# Patient Record
Sex: FEMALE | Race: WHITE | NOT HISPANIC OR LATINO | Employment: FULL TIME | URBAN - METROPOLITAN AREA
[De-identification: names, ages, dates, MRNs, and addresses within clinical notes are randomized per-mention and may not be internally consistent; named-entity substitution may affect disease eponyms.]

---

## 2017-06-07 ENCOUNTER — APPOINTMENT (OUTPATIENT)
Dept: WOMENS IMAGING | Facility: HOSPITAL | Age: 52
End: 2017-06-07

## 2017-06-07 PROCEDURE — 77067 SCR MAMMO BI INCL CAD: CPT | Performed by: RADIOLOGY

## 2017-11-01 ENCOUNTER — PREP FOR SURGERY (OUTPATIENT)
Dept: OTHER | Facility: HOSPITAL | Age: 52
End: 2017-11-01

## 2017-11-01 DIAGNOSIS — Z12.11 SCREENING FOR COLON CANCER: Primary | ICD-10-CM

## 2017-12-12 PROBLEM — Z12.11 SCREENING FOR COLON CANCER: Status: ACTIVE | Noted: 2017-12-12

## 2018-05-04 RX ORDER — DIPHENHYDRAMINE HCL 25 MG
25 CAPSULE ORAL NIGHTLY PRN
COMMUNITY

## 2018-05-04 RX ORDER — ERGOCALCIFEROL 1.25 MG/1
50000 CAPSULE ORAL WEEKLY
COMMUNITY

## 2018-05-04 RX ORDER — LOVASTATIN 20 MG/1
20 TABLET ORAL NIGHTLY
COMMUNITY

## 2018-05-04 RX ORDER — LANOLIN ALCOHOL/MO/W.PET/CERES
1000 CREAM (GRAM) TOPICAL DAILY
COMMUNITY

## 2018-05-04 RX ORDER — VENLAFAXINE HYDROCHLORIDE 37.5 MG/1
37.5 CAPSULE, EXTENDED RELEASE ORAL DAILY
COMMUNITY

## 2018-05-04 RX ORDER — CHLORAL HYDRATE 500 MG
1000 CAPSULE ORAL
COMMUNITY

## 2018-05-04 RX ORDER — BENAZEPRIL HYDROCHLORIDE AND HYDROCHLOROTHIAZIDE 20; 12.5 MG/1; MG/1
2 TABLET ORAL DAILY
COMMUNITY
Start: 2015-01-30

## 2018-05-07 ENCOUNTER — ANESTHESIA EVENT (OUTPATIENT)
Dept: GASTROENTEROLOGY | Facility: HOSPITAL | Age: 53
End: 2018-05-07

## 2018-05-07 ENCOUNTER — ANESTHESIA (OUTPATIENT)
Dept: GASTROENTEROLOGY | Facility: HOSPITAL | Age: 53
End: 2018-05-07

## 2018-05-07 ENCOUNTER — HOSPITAL ENCOUNTER (OUTPATIENT)
Facility: HOSPITAL | Age: 53
Setting detail: HOSPITAL OUTPATIENT SURGERY
Discharge: HOME | End: 2018-05-07
Attending: INTERNAL MEDICINE | Admitting: INTERNAL MEDICINE

## 2018-05-07 VITALS
OXYGEN SATURATION: 98 % | WEIGHT: 254.6 LBS | HEART RATE: 64 BPM | DIASTOLIC BLOOD PRESSURE: 77 MMHG | SYSTOLIC BLOOD PRESSURE: 111 MMHG | BODY MASS INDEX: 45.11 KG/M2 | HEIGHT: 63 IN | RESPIRATION RATE: 16 BRPM | TEMPERATURE: 97.9 F

## 2018-05-07 DIAGNOSIS — Z12.11 SCREENING FOR COLON CANCER: ICD-10-CM

## 2018-05-07 PROCEDURE — 45380 COLONOSCOPY AND BIOPSY: CPT | Performed by: INTERNAL MEDICINE

## 2018-05-07 PROCEDURE — 25010000002 PROPOFOL 10 MG/ML EMULSION: Performed by: ANESTHESIOLOGY

## 2018-05-07 PROCEDURE — 45385 COLONOSCOPY W/LESION REMOVAL: CPT | Performed by: INTERNAL MEDICINE

## 2018-05-07 PROCEDURE — S0260 H&P FOR SURGERY: HCPCS | Performed by: INTERNAL MEDICINE

## 2018-05-07 PROCEDURE — 88305 TISSUE EXAM BY PATHOLOGIST: CPT | Performed by: INTERNAL MEDICINE

## 2018-05-07 RX ORDER — SODIUM CHLORIDE, SODIUM LACTATE, POTASSIUM CHLORIDE, CALCIUM CHLORIDE 600; 310; 30; 20 MG/100ML; MG/100ML; MG/100ML; MG/100ML
1000 INJECTION, SOLUTION INTRAVENOUS CONTINUOUS
Status: DISCONTINUED | OUTPATIENT
Start: 2018-05-07 | End: 2018-05-07 | Stop reason: HOSPADM

## 2018-05-07 RX ORDER — PROPOFOL 10 MG/ML
VIAL (ML) INTRAVENOUS AS NEEDED
Status: DISCONTINUED | OUTPATIENT
Start: 2018-05-07 | End: 2018-05-07 | Stop reason: SURG

## 2018-05-07 RX ORDER — SODIUM CHLORIDE 0.9 % (FLUSH) 0.9 %
3 SYRINGE (ML) INJECTION AS NEEDED
Status: DISCONTINUED | OUTPATIENT
Start: 2018-05-07 | End: 2018-05-07 | Stop reason: HOSPADM

## 2018-05-07 RX ORDER — PROPOFOL 10 MG/ML
VIAL (ML) INTRAVENOUS CONTINUOUS PRN
Status: DISCONTINUED | OUTPATIENT
Start: 2018-05-07 | End: 2018-05-07 | Stop reason: SURG

## 2018-05-07 RX ORDER — LIDOCAINE HYDROCHLORIDE 20 MG/ML
INJECTION, SOLUTION INFILTRATION; PERINEURAL AS NEEDED
Status: DISCONTINUED | OUTPATIENT
Start: 2018-05-07 | End: 2018-05-07 | Stop reason: SURG

## 2018-05-07 RX ORDER — LIDOCAINE HYDROCHLORIDE 10 MG/ML
0.5 INJECTION, SOLUTION INFILTRATION; PERINEURAL ONCE AS NEEDED
Status: DISCONTINUED | OUTPATIENT
Start: 2018-05-07 | End: 2018-05-07 | Stop reason: HOSPADM

## 2018-05-07 RX ADMIN — EPHEDRINE SULFATE 10 MG: 50 INJECTION INTRAMUSCULAR; INTRAVENOUS; SUBCUTANEOUS at 13:28

## 2018-05-07 RX ADMIN — LIDOCAINE HYDROCHLORIDE 30 MG: 20 INJECTION, SOLUTION INFILTRATION; PERINEURAL at 13:06

## 2018-05-07 RX ADMIN — SODIUM CHLORIDE, POTASSIUM CHLORIDE, SODIUM LACTATE AND CALCIUM CHLORIDE 1000 ML: 600; 310; 30; 20 INJECTION, SOLUTION INTRAVENOUS at 12:27

## 2018-05-07 RX ADMIN — SODIUM CHLORIDE, POTASSIUM CHLORIDE, SODIUM LACTATE AND CALCIUM CHLORIDE: 600; 310; 30; 20 INJECTION, SOLUTION INTRAVENOUS at 12:59

## 2018-05-07 RX ADMIN — PROPOFOL 300 MCG/KG/MIN: 10 INJECTION, EMULSION INTRAVENOUS at 13:06

## 2018-05-07 RX ADMIN — PROPOFOL 150 MG: 10 INJECTION, EMULSION INTRAVENOUS at 13:06

## 2018-05-07 NOTE — ANESTHESIA POSTPROCEDURE EVALUATION
Patient: Darby Ramesh    Procedure Summary     Date:  05/07/18 Room / Location:  [facility] ENDOSCOPY 6 /  [facility]    Anesthesia Start:  1259 Anesthesia Stop:  1333    Procedure:  COLONOSCOPY WITH POLYPECTOMY (COLD BX AND HOT SNARE) (N/A ) Diagnosis:       Screening for colon cancer      (Screening for colon cancer [Z12.11])    Surgeon:  Tasia Maldonado MD Provider:  Lucho Eagle MD    Anesthesia Type:  MAC ASA Status:  3          Anesthesia Type: MAC  Last vitals  BP   131/73 (05/07/18 1217)   Temp   36.8 °C (98.2 °F) (05/07/18 1217)   Pulse   73 (05/07/18 1217)   Resp   18 (05/07/18 1217)     SpO2   99 % (05/07/18 1217)     Post Anesthesia Care and Evaluation    Patient location during evaluation: PHASE II  Patient participation: complete - patient participated  Level of consciousness: awake and alert  Pain management: adequate  Airway patency: patent  Anesthetic complications: No anesthetic complications  PONV Status: none  Cardiovascular status: acceptable  Respiratory status: acceptable  Hydration status: acceptable

## 2018-05-07 NOTE — ANESTHESIA PREPROCEDURE EVALUATION
Anesthesia Evaluation     Patient summary reviewed and Nursing notes reviewed                Airway   Mallampati: III  TM distance: <3 FB  Neck ROM: full  Possible difficult intubation  Dental - normal exam     Pulmonary - normal exam   Cardiovascular - normal exam    (+) hypertension, hyperlipidemia,       Neuro/Psych  (+) psychiatric history Anxiety,     GI/Hepatic/Renal/Endo    (+) obesity, morbid obesity, GERD,      Musculoskeletal     Abdominal    Substance History      OB/GYN          Other                      Anesthesia Plan    ASA 3     MAC     intravenous induction   Anesthetic plan and risks discussed with patient.

## 2018-05-07 NOTE — DISCHARGE INSTRUCTIONS
If you do not get your pathology results within 2 weeks, call Dr. Maldonado at [phone]  Your pathology results will determine the timing of your next colonoscopy

## 2018-05-07 NOTE — H&P
[facility]  Pre Procedure History & Physical    Chief Complaint:   screening    Subjective     HPI:   54 yo WF for initial screening colonoscopy.  She denies any GI issues.  No FH CRC/polyps.    Past Medical History:   Past Medical History:   Diagnosis Date   • Anxiety    • GERD (gastroesophageal reflux disease)    • Hyperlipidemia    • Hypertension        Past Surgical History:  Past Surgical History:   Procedure Laterality Date   •  SECTION      X2       Family History:  Family History   Problem Relation Age of Onset   • Malig Hyperthermia Neg Hx        Social History:   reports that she has never smoked. She has never used smokeless tobacco. She reports that she does not drink alcohol or use drugs.    Medications:   Prescriptions Prior to Admission   Medication Sig Dispense Refill Last Dose   • benazepril-hydrochlorthiazide (LOTENSIN HCT) 20-12.5 MG per tablet Take 2 tablets by mouth Daily.   2018 at Unknown time   • diphenhydrAMINE (BENADRYL) 25 mg capsule Take 25 mg by mouth At Night As Needed for Itching.   2018   • lovastatin (MEVACOR) 20 MG tablet Take 20 mg by mouth Every Night.   2018   • Omega-3 Fatty Acids (FISH OIL) 1000 MG capsule capsule Take 1,000 mg by mouth Daily With Breakfast.   2018   • Probiotic Product (PROBIOTIC ADVANCED PO) Take 1 melissa/oz by mouth Daily.   2018   • venlafaxine XR (EFFEXOR-XR) 37.5 MG 24 hr capsule Take 37.5 mg by mouth Daily.   2018 at Unknown time   • vitamin B-12 (CYANOCOBALAMIN) 1000 MCG tablet Take 1,000 mcg by mouth Daily.   2018   • vitamin D (ERGOCALCIFEROL) 42283 units capsule capsule Take 50,000 Units by mouth 1 (One) Time Per Week.   2018       Allergies:  Erythromycin; Penicillins; and Sulfa antibiotics    ROS:    Pertinent items are noted in HPI, all other systems reviewed and negative     Objective     Blood pressure 131/73, pulse 73, temperature 98.2 °F (36.8 °C), temperature source Oral, resp.  "rate 18, height 158.8 cm (62.5\"), weight 115 kg (254 lb 9.6 oz), last menstrual period 05/02/2018, SpO2 99 %.    Physical Exam   Constitutional: Pt is oriented to person, place, and time and well-developed, well-nourished, and in no distress.   Mouth/Throat: Oropharynx is clear and moist.   Neck: Normal range of motion.   Cardiovascular: Normal rate, regular rhythm and normal heart sounds.    Pulmonary/Chest: Effort normal and breath sounds normal.   Abdominal: Soft. Nontender  Skin: Skin is warm and dry.   Psychiatric: Mood, memory, affect and judgment normal.     Assessment/Plan     Diagnosis:  screening    Anticipated Surgical Procedure:  colonoscopy    The risks, benefits, and alternatives of this procedure have been discussed with the patient or the responsible party- the patient understands and agrees to proceed.                                                            "

## 2018-05-08 ENCOUNTER — TELEPHONE (OUTPATIENT)
Dept: GASTROENTEROLOGY | Facility: CLINIC | Age: 53
End: 2018-05-08

## 2018-05-08 LAB
CYTO UR: NORMAL
LAB AP CASE REPORT: NORMAL
Lab: NORMAL
PATH REPORT.FINAL DX SPEC: NORMAL
PATH REPORT.GROSS SPEC: NORMAL

## 2018-05-10 NOTE — TELEPHONE ENCOUNTER
Call to pt.  Advise per Dr Maldonado that the polyp(s) bx showed adenomatous change.  This is not cancerous, but is considered potentially precancerous.  F/u c/s in 3 yrs is advised.  Pt verb understanding.    C/s for 5/7/21 is in recall.

## 2018-07-30 ENCOUNTER — APPOINTMENT (OUTPATIENT)
Dept: WOMENS IMAGING | Facility: HOSPITAL | Age: 53
End: 2018-07-30

## 2018-07-30 PROCEDURE — 77063 BREAST TOMOSYNTHESIS BI: CPT | Performed by: RADIOLOGY

## 2018-07-30 PROCEDURE — 77067 SCR MAMMO BI INCL CAD: CPT | Performed by: RADIOLOGY

## 2018-11-12 ENCOUNTER — TELEPHONE (OUTPATIENT)
Dept: GASTROENTEROLOGY | Facility: CLINIC | Age: 53
End: 2018-11-12

## 2018-11-12 NOTE — TELEPHONE ENCOUNTER
Call to pt.  State was d/c from [facility] yesterday - advised to have EGD.    Advise will obtain records for review.  Verb understanding. -

## 2018-11-12 NOTE — TELEPHONE ENCOUNTER
----- Message from Andrés Díaz sent at 11/12/2018  3:09 PM EST -----  Regarding: EGD  Contact: [phone]  PT WAS DISCHARGE FROM THE HOSPITAL TODAY AND NEED TO BE SCHFOR AN EGD DUE TO HAVE GI PROBLEMS

## 2018-11-13 NOTE — TELEPHONE ENCOUNTER
Call to [facility] @ [phone] - QC left with request for recent hosp d/c summary, labs/imaging be faxed to [phone].

## 2018-11-14 ENCOUNTER — TELEPHONE (OUTPATIENT)
Dept: GASTROENTEROLOGY | Facility: CLINIC | Age: 53
End: 2018-11-14

## 2018-11-14 NOTE — TELEPHONE ENCOUNTER
Regarding: Non-Urgent Medical Question  Contact: [phone]  ----- Message from Nyla, Generic sent at 11/14/2018  3:17 PM EST -----    Just following up re my phone conversation with Reta Joseph on Monday, 11-12-18, re pain/burning in my chest and back. I was hospitalized on Sunday evening at [facility]. I was checked thoroughly for heart issues which doesn't appear to be the problem. I was prescribed the generic form of Protonix but am still having issues with the pain/burning. Of course, I've only been on the medication for 3 days. My primary care doctor information is below if that would be helpful. I have an appointment with her on Tuesday, November 20, to follow up regarding this same issue.  Thank you!    Terrie Solitario MD  [facility]  [address], [place] [address] [phone]

## 2018-11-14 NOTE — TELEPHONE ENCOUNTER
Extensive records received - under Media Tab as [place], [place] First Addendum, and [place] Second Addendum.    Message to Dr Maldonado.

## 2018-11-14 NOTE — TELEPHONE ENCOUNTER
Call to pt.  Advise that records from [facility] received today and to Dr Maldonado for review.  Will also forward this message to DR Maldonado.  Pt verb understanding.

## 2018-11-15 ENCOUNTER — PREP FOR SURGERY (OUTPATIENT)
Dept: OTHER | Facility: HOSPITAL | Age: 53
End: 2018-11-15

## 2018-11-15 DIAGNOSIS — R12 HEARTBURN: ICD-10-CM

## 2018-11-15 DIAGNOSIS — R07.89 OTHER CHEST PAIN: Primary | ICD-10-CM

## 2018-11-15 NOTE — TELEPHONE ENCOUNTER
Records reviewed Recommend Carafate 4 times a day as well-okay to schedule EGD at [facility] only-case request in chart

## 2018-11-16 RX ORDER — SUCRALFATE ORAL 1 G/10ML
1 SUSPENSION ORAL 4 TIMES DAILY
Qty: 1000 ML | Refills: 1 | Status: SHIPPED | OUTPATIENT
Start: 2018-11-16 | End: 2019-03-26

## 2018-11-16 NOTE — TELEPHONE ENCOUNTER
Called pt and advised per Dr Maldonado that she has reviewed her records and she recommens carafate 4 times a dya as well.  Ok to schedule egd at [facility] only.      Pt verb understanding and reports the carafate sups was very expensive.  Advised pt we will call her pharmacy and will change this to tablet form.  Also advised pt that she can dissolve the tablet in a sm bit of water and drink. Pt verb understanding.     Called pt's [facility] and spoke with pharmacist Osorio and had them switch to carafate tabs.  He repeated and verified.

## 2018-11-19 ENCOUNTER — TELEPHONE (OUTPATIENT)
Dept: GASTROENTEROLOGY | Facility: CLINIC | Age: 53
End: 2018-11-19

## 2018-11-19 NOTE — TELEPHONE ENCOUNTER
Call to pt.  Advise that DR Maldonado out of office this week.  Will send message to Jeane re: fmla, but this will be delayed in completion.  Pt verb understanding.    Pt fmla forms under Media Tab.

## 2018-11-19 NOTE — TELEPHONE ENCOUNTER
Regarding: Non-Urgent Medical Question  Contact: [phone]  ----- Message from [employer], Generic sent at 11/16/2018  3:58 PM EST -----    I received the prescribed medication and look forward to scheduling the endoscopy.  I believe the Protonix is causing me to be dizzy and lightheaded; however, I will try again tomorrow and begin the new medication as well.  I have a low intolerance for a lot of medications.    Also - unfortunately, I am out of PTO time for my hospital stay, doctors and procedures for this year and have been granted intermittent FMLA.  I attach a four page FMLA certification form which I will need completed by Dr. Maldonado as soon as it is convenient for her to do so.    Please let me know if you have any questions or need any further information from me.  I greatly appreciate your assistance.    Thank you,  Lanie Ramesh

## 2018-11-26 ENCOUNTER — TELEPHONE (OUTPATIENT)
Dept: GASTROENTEROLOGY | Facility: CLINIC | Age: 53
End: 2018-11-26

## 2018-11-26 NOTE — TELEPHONE ENCOUNTER
Regarding: Non-Urgent Medical Question  Contact: [phone]  ----- Message from [employer], Generic sent at 11/26/2018 12:25 PM EST -----    DIEGO - I was advised that I would be receiving a call from your scheduling department re an appointment for an endoscopy (EGD) with Dr. Maldonado.  Just wanted to let you know that I have not received a call yet.    Thank you,  Lanie

## 2018-12-26 ENCOUNTER — TELEPHONE (OUTPATIENT)
Dept: GASTROENTEROLOGY | Facility: CLINIC | Age: 53
End: 2018-12-26

## 2018-12-26 NOTE — TELEPHONE ENCOUNTER
Called pt and advised if running fever should reschedule egd.  Pt states she has not had a fever today and will see how she feels tomorrow.  Pt states if feeling bad , will call back and reschedule. Update sent to Dr Maldonado.

## 2018-12-26 NOTE — TELEPHONE ENCOUNTER
Regarding: Non-Urgent Medical Question  Contact: [phone]  ----- Message from [employer], Generic sent at 12/26/2018  9:24 AM EST -----    Good morning.    I am scheduled for an EGD this  Friday morning at [facility] with Dr. Maldonado.  I have been battling a cough and low grade fever for several days now.  Urgent care on Friday said that it was viral, but since I'm not better - I am checking with my primary care doctor.    Will I still be able to have the procedure done on Friday if I'm still coughing, etc.?    Please advise.  Thank you.    Lanie Ramesh

## 2019-01-18 ENCOUNTER — OUTSIDE FACILITY SERVICE (OUTPATIENT)
Dept: GASTROENTEROLOGY | Facility: CLINIC | Age: 54
End: 2019-01-18

## 2019-01-18 PROCEDURE — 43239 EGD BIOPSY SINGLE/MULTIPLE: CPT | Performed by: INTERNAL MEDICINE

## 2019-01-28 ENCOUNTER — TELEPHONE (OUTPATIENT)
Dept: GASTROENTEROLOGY | Facility: CLINIC | Age: 54
End: 2019-01-28

## 2019-01-28 NOTE — TELEPHONE ENCOUNTER
Stomach bx showed chronic inflammation - all other bx benign - cont current meds, diet modification - 8 week f/u

## 2019-01-31 NOTE — TELEPHONE ENCOUNTER
Call to pt.  Advise per DR Maldonado that stomach bx showed chronic inflammation .  All other bx benign.  Continue current meds, diet modifications.  8 wk f/u.    Pt verb understanding.  Appt scheduled for 3/26 @ 8:30am.

## 2019-02-15 ENCOUNTER — TELEPHONE (OUTPATIENT)
Dept: GASTROENTEROLOGY | Facility: CLINIC | Age: 54
End: 2019-02-15

## 2019-02-15 NOTE — TELEPHONE ENCOUNTER
Regarding: Prescription Question  Contact: [phone]  ----- Message from [employer], Generic sent at 2/15/2019 10:57 AM EST -----    Good morning!    I have been experiencing muscle spasms in my back off and on for the past 3 or 4 weeks - sometimes so bad that I have trouble moving at all.  I read on WebMD that Protonix can cause muscle spasms and am wondering if that could be a possibility in my case?  If so, could I cut back on the dosage and see if that helps?    The protonix is helping my GERD symptoms (although not completely), and I take the Carafate for days that are worse.    Just looking for advice.    Thanks,  Lanie Ramesh

## 2019-02-15 NOTE — TELEPHONE ENCOUNTER
Called pt and advised per Dr Maldonado that it is ok to cut back on protonix and can use ranitidine instead if needed. Pt verb understanding.

## 2019-03-26 ENCOUNTER — OFFICE VISIT (OUTPATIENT)
Dept: GASTROENTEROLOGY | Facility: CLINIC | Age: 54
End: 2019-03-26

## 2019-03-26 VITALS
BODY MASS INDEX: 44.76 KG/M2 | DIASTOLIC BLOOD PRESSURE: 90 MMHG | TEMPERATURE: 98 F | HEIGHT: 63 IN | WEIGHT: 252.6 LBS | SYSTOLIC BLOOD PRESSURE: 150 MMHG

## 2019-03-26 DIAGNOSIS — K21.9 GASTROESOPHAGEAL REFLUX DISEASE WITHOUT ESOPHAGITIS: Primary | ICD-10-CM

## 2019-03-26 DIAGNOSIS — K29.00 OTHER ACUTE GASTRITIS WITHOUT HEMORRHAGE: ICD-10-CM

## 2019-03-26 PROCEDURE — 99214 OFFICE O/P EST MOD 30 MIN: CPT | Performed by: NURSE PRACTITIONER

## 2019-03-26 RX ORDER — RANITIDINE 150 MG/1
150 TABLET ORAL 2 TIMES DAILY
Qty: 60 TABLET | Refills: 11 | Status: SHIPPED | OUTPATIENT
Start: 2019-03-26 | End: 2022-01-14

## 2019-03-26 NOTE — PROGRESS NOTES
Chief Complaint   Patient presents with   • Follow-up     post scopes   • Heartburn       Darby Ramesh is a  54 y.o. female here for a follow up visit for GERD.    HPI  53 yo f presents today for GERD. She is a patient of Dr. Maldonado. She underwent EGD on 19 that showed gastritis and fundic gland polyp. Path was negative. She was started on Protonix, zantac and Carafate. She admits the Protonix made her feel bad so she is just taking the zantac 150 mg daily and she admits it works well for her. Occasionally she has to take it BID but that is rare. She denies any dysphagia, reflux, abd pain, N&V, diarrhea, constipation, rectal bleeding or melena. She admits her appetite is good and her weight is stable.       Past Medical History:   Diagnosis Date   • Anxiety    • Colon polyp May 7, 2018    Removed during colonoscopy with Dr. Maldonado on May 7, 2018.   • Diabetes mellitus (CMS/HCC)     Pre-Diabetes   • GERD (gastroesophageal reflux disease)    • Hyperlipidemia    • Hypertension        Past Surgical History:   Procedure Laterality Date   •  SECTION      X2   • COLONOSCOPY N/A 2018    TA/hyperplastic mix, diverticulosis, NBIH, otherwise normal exam   • ENDOSCOPY  2019    gastritis, fundic gland polyp, otherwise normal exam       Scheduled Meds:    Continuous Infusions:  No current facility-administered medications for this visit.     PRN Meds:.    Allergies   Allergen Reactions   • Erythromycin Nausea And Vomiting   • Penicillins Rash   • Sulfa Antibiotics Nausea And Vomiting       Social History     Socioeconomic History   • Marital status: Unknown     Spouse name: Not on file   • Number of children: Not on file   • Years of education: Not on file   • Highest education level: Not on file   Tobacco Use   • Smoking status: Never Smoker   • Smokeless tobacco: Never Used   Substance and Sexual Activity   • Alcohol use: No   • Drug use: No   • Sexual activity: Yes     Partners: Male     Birth  control/protection: Coitus interruptus       Family History   Problem Relation Age of Onset   • Malig Hyperthermia Neg Hx        Review of Systems   Constitutional: Negative for appetite change, chills, diaphoresis, fatigue, fever and unexpected weight change.   HENT: Negative for nosebleeds, postnasal drip, sore throat, trouble swallowing and voice change.    Respiratory: Negative for cough, choking, chest tightness, shortness of breath and wheezing.    Cardiovascular: Negative for chest pain.   Gastrointestinal: Negative for abdominal distention, abdominal pain, anal bleeding, blood in stool, constipation, diarrhea, nausea, rectal pain and vomiting.   Endocrine: Negative for polydipsia, polyphagia and polyuria.   Musculoskeletal: Negative for gait problem.   Skin: Negative for rash and wound.   Allergic/Immunologic: Negative for food allergies.   Neurological: Negative for dizziness, speech difficulty and light-headedness.   Psychiatric/Behavioral: Negative for confusion, self-injury, sleep disturbance and suicidal ideas.       Vitals:    03/26/19 0832   BP: 150/90   Temp: 98 °F (36.7 °C)       Physical Exam   Constitutional: She is oriented to person, place, and time. She appears well-developed and well-nourished. She does not appear ill. No distress.   HENT:   Head: Normocephalic.   Eyes: Pupils are equal, round, and reactive to light.   Cardiovascular: Normal rate, regular rhythm and normal heart sounds.   Pulmonary/Chest: Effort normal and breath sounds normal.   Abdominal: Soft. Bowel sounds are normal. She exhibits no distension and no mass. There is no hepatosplenomegaly. There is no tenderness. There is no rebound and no guarding. No hernia.   Musculoskeletal: Normal range of motion.   Neurological: She is alert and oriented to person, place, and time.   Skin: Skin is warm and dry.   Psychiatric: She has a normal mood and affect. Her speech is normal and behavior is normal. Judgment normal.       No images  are attached to the encounter.    Assessment & Plan    1. Gastroesophageal reflux disease without esophagitis    2. Other acute gastritis without hemorrhage    I reviewed the EGD results with her. She seems to be doing well with the ranitidine 150 mg daily for now. Will refill x 1 year. Continue GERD precautions. Call office with any issues. Follow up with me or Dr. Maldonado in 1 year.

## 2019-08-22 ENCOUNTER — APPOINTMENT (OUTPATIENT)
Dept: WOMENS IMAGING | Facility: HOSPITAL | Age: 54
End: 2019-08-22

## 2019-08-22 PROCEDURE — 77063 BREAST TOMOSYNTHESIS BI: CPT | Performed by: RADIOLOGY

## 2019-08-22 PROCEDURE — 77067 SCR MAMMO BI INCL CAD: CPT | Performed by: RADIOLOGY

## 2020-09-23 ENCOUNTER — APPOINTMENT (OUTPATIENT)
Dept: WOMENS IMAGING | Facility: HOSPITAL | Age: 55
End: 2020-09-23

## 2020-09-23 PROCEDURE — 77067 SCR MAMMO BI INCL CAD: CPT | Performed by: RADIOLOGY

## 2020-09-23 PROCEDURE — 77063 BREAST TOMOSYNTHESIS BI: CPT | Performed by: RADIOLOGY

## 2021-03-24 ENCOUNTER — BULK ORDERING (OUTPATIENT)
Dept: CASE MANAGEMENT | Facility: OTHER | Age: 56
End: 2021-03-24

## 2021-03-24 DIAGNOSIS — Z23 IMMUNIZATION DUE: ICD-10-CM

## 2021-04-12 ENCOUNTER — TELEPHONE (OUTPATIENT)
Dept: GASTROENTEROLOGY | Facility: CLINIC | Age: 56
End: 2021-04-12

## 2021-04-12 NOTE — TELEPHONE ENCOUNTER
----- Message from Remberto Mcdermott sent at 4/9/2021  2:12 PM EDT -----  Regarding: OA  Contact: [phone]  PT needs to complete open access for a routine scope

## 2021-04-12 NOTE — TELEPHONE ENCOUNTER
Spoke with pt mailed Open access health history to be filled out and returned for MD to place orders.  Last cls 5-7-2018 in epic Dr. Maldonado

## 2021-04-22 ENCOUNTER — TELEPHONE (OUTPATIENT)
Dept: GASTROENTEROLOGY | Facility: CLINIC | Age: 56
End: 2021-04-22

## 2021-04-22 NOTE — TELEPHONE ENCOUNTER
Last scope 05/07/2018 in [facility]-- personal hx of polyps-- no family hx of polyps or colon ca-- no ASA or blood thinners-- medications:    benazepril-hydrochlorthiazide (LOTENSIN HCT) 20-12.5 MG per tablet  diphenhydrAMINE (BENADRYL) 25 mg capsule  lovastatin (MEVACOR) 20 MG tablet  Omega-3 Fatty Acids (FISH OIL) 1000 MG capsule capsule  Probiotic Product (PROBIOTIC ADVANCED PO)  venlafaxine XR (EFFEXOR-XR) 37.5 MG 24 hr capsule  vitamin B-12 (CYANOCOBALAMIN) 1000 MCG tablet  vitamin D (ERGOCALCIFEROL) 97182 units capsule capsule  Azelastine nasal spray   Hydrochlorothiazide   Famotidine   Fluticasone nasal spray       OA form scanned into media

## 2021-04-23 ENCOUNTER — PREP FOR SURGERY (OUTPATIENT)
Dept: OTHER | Facility: HOSPITAL | Age: 56
End: 2021-04-23

## 2021-04-23 DIAGNOSIS — D12.6 ADENOMATOUS POLYP OF COLON, UNSPECIFIED PART OF COLON: Primary | ICD-10-CM

## 2021-05-20 ENCOUNTER — TELEPHONE (OUTPATIENT)
Dept: GASTROENTEROLOGY | Facility: CLINIC | Age: 56
End: 2021-05-20

## 2021-07-21 ENCOUNTER — TELEPHONE (OUTPATIENT)
Dept: GASTROENTEROLOGY | Facility: CLINIC | Age: 56
End: 2021-07-21

## 2021-07-21 NOTE — TELEPHONE ENCOUNTER
----- Message from Darby Ramesh sent at 7/20/2021  4:24 PM EDT -----  Regarding: Non-Urgent Medical Question  Contact: [phone]  I am currently scheduled for a colonoscopy with Dr. Maldonado on Friday, August 6th.  Today I saw my primary care doctor, Dr. Yelena Barbosa, and told her that the past few days I've had some of the same issues I had a few years ago when Dr. Maldonado prescribed medication for GERD.  Mild chest pain and burning in the esophagus area and a little back pain between my shoulders as well.  It's not as bad as it was the last time I was treated.  Dr. Barbosa did an EKG and believes it may be GI related.  She told me I should check with Dr. Maldonado to see if she would want to add an endoscopy to my scheduled colonoscopy and knock them both out at once.  Or maybe just another round of Sulcralate or something? I don't tolerate PPIs very well.  Thanks!

## 2021-07-26 ENCOUNTER — TELEPHONE (OUTPATIENT)
Dept: GASTROENTEROLOGY | Facility: CLINIC | Age: 56
End: 2021-07-26

## 2021-07-26 RX ORDER — SUCRALFATE 1 G/1
1 TABLET ORAL 3 TIMES DAILY PRN
Qty: 30 TABLET | Refills: 0 | Status: SHIPPED | OUTPATIENT
Start: 2021-07-26 | End: 2021-08-04

## 2021-07-26 NOTE — TELEPHONE ENCOUNTER
Called pt and on vm advised of Dr Maldonado's note.Advised to call with questions.     Message sent to Mray in scheduling.

## 2021-07-26 NOTE — TELEPHONE ENCOUNTER
----- Message from Darby Ramesh sent at 7/23/2021  8:06 PM EDT -----  Regarding: Non-Urgent Medical Question  Contact: [phone]  This is an update to my 7/20 message.  I'm still having the chest pains and burning and am hoping that Dr. Maldonado could prescribe the Carafate (Sucralfate) in the tablet form again.  That really helped when I was having this same issue in 2018.  I'm still taking OTC Pepcid once or twice a day, but it isn't helping right now.  And I don't tolerate PPIs like Prilosec very well.    I will call on Monday.    Thank you,  Lanie Ramesh

## 2021-07-27 ENCOUNTER — TELEPHONE (OUTPATIENT)
Dept: GASTROENTEROLOGY | Facility: CLINIC | Age: 56
End: 2021-07-27

## 2021-07-27 NOTE — TELEPHONE ENCOUNTER
Please let her know that because she has had polyps, sometimes insurance changes this to diagnostic.  She will need to ask her insurance company what they would pay for colonoscopy due to personal history of polyps.  The EGD is always diagnostic.

## 2021-07-27 NOTE — TELEPHONE ENCOUNTER
"----- Message from Darby Ramesh sent at 7/27/2021 12:35 PM EDT -----  Regarding: Non-Urgent Medical Question  Contact: [phone]  So sorry for the many messages; however, I just received a call from [facility] regarding the cost of the EGD/Colonoscopy I am scheduled for on 8/6/21.  I was told the procedure is coded as a \"diagnostic\" procedure which means I will be responsible for over $1,000 out of pocket.  My insurance is [employer] and will pay in full if the procedure is coded as a \"screening/preventative\".  Is there any way Dr. Maldonado could change the code to \"screening/preventative\"?  Otherwise, I'm not sure I will be able to go through with it at this time.    Please advise.    Thank you,  Lanie Ramesh  "

## 2021-08-03 NOTE — TELEPHONE ENCOUNTER
Escdribe request received for sucalfate 1 gm - take 1 taby po 3x/day as needed for dyspepsia.     See note of 7/26 .  Pt has cancelled 8/6 appt.  Message to Dr Maldonado.

## 2021-08-04 RX ORDER — SUCRALFATE 1 G/1
TABLET ORAL
Qty: 30 TABLET | Refills: 0 | Status: SHIPPED | OUTPATIENT
Start: 2021-08-04

## 2021-09-21 ENCOUNTER — TELEPHONE (OUTPATIENT)
Dept: GASTROENTEROLOGY | Facility: CLINIC | Age: 56
End: 2021-09-21

## 2021-09-21 NOTE — TELEPHONE ENCOUNTER
----- Message from Darby Ramesh sent at 9/21/2021  3:39 PM EDT -----  Regarding: Non-Urgent Medical Question  Contact: [phone]  I had to cancel my colonoscopy appointment that was originally scheduled the first week of August 2021.  I would like to reschedule the screening for sometime in April of 2022 when the olaf year of my insurance begins.  However, I'm not sure you are scheduling screenings at this time due to COVID.    Please advise.    Thank you,  Lanie Ramesh

## 2021-09-30 ENCOUNTER — APPOINTMENT (OUTPATIENT)
Dept: WOMENS IMAGING | Facility: HOSPITAL | Age: 56
End: 2021-09-30

## 2021-09-30 PROCEDURE — 77063 BREAST TOMOSYNTHESIS BI: CPT | Performed by: RADIOLOGY

## 2021-09-30 PROCEDURE — 77067 SCR MAMMO BI INCL CAD: CPT | Performed by: RADIOLOGY

## 2021-11-01 ENCOUNTER — TELEPHONE (OUTPATIENT)
Dept: GASTROENTEROLOGY | Facility: CLINIC | Age: 56
End: 2021-11-01

## 2021-11-01 NOTE — TELEPHONE ENCOUNTER
----- Message from Darby Ramesh sent at 10/29/2021 11:53 AM EDT -----  Regarding: Esophageal Pain and Burning  In July I requested Carafate tablets to help with chest pains and burning  of the esophagus and burning between shoulder blades of back.  I took the medicine and Prilosec for a few weeks and the pain went away.  I am once again dealing with the pain after going back to [facility].  Been going  on for about a week.  I have taken Prilosec all week and started the Carafate again but still having pain.  Just checking to be sure I'm doing all I can to relieve the pain.    I also need to schedule a colonoscopy and probably EGD.  I wanted to wait until April to do that due to insurance olaf year beginning in April, but should I schedule for first of year due to my issue?  Thank you!

## 2021-11-01 NOTE — TELEPHONE ENCOUNTER
Please schedule office consultation with NP/PA to reassess her symptoms-or with me if there is a cancellation

## 2021-11-02 ENCOUNTER — TELEPHONE (OUTPATIENT)
Dept: GASTROENTEROLOGY | Facility: CLINIC | Age: 56
End: 2021-11-02

## 2021-11-02 NOTE — TELEPHONE ENCOUNTER
Called pt and advised of Dr Maldonado's note. She verb understanding.     Pt states that she is ready to have c/s done and would also like to have an egd if possible.   Due to her job she can not get scopes done until after Jan and she can not make appt until Jan. Advised will send message to Dr Maldonado.  Verb understanding.

## 2021-11-02 NOTE — TELEPHONE ENCOUNTER
----- Message from Darby Ramesh sent at 11/1/2021  9:20 AM EDT -----  Regarding: Feeling better  Good morning - The Carafate and Prilosec seem to be helping.  I feel better and will continue this treatment.    I will call to schedule a colonoscopy and EGD sometime this week.    Thank you,  Lanie Ramesh

## 2021-11-03 ENCOUNTER — PREP FOR SURGERY (OUTPATIENT)
Dept: OTHER | Facility: HOSPITAL | Age: 56
End: 2021-11-03

## 2021-11-03 DIAGNOSIS — R12 HEARTBURN: Primary | ICD-10-CM

## 2021-11-03 DIAGNOSIS — Z12.11 ENCOUNTER FOR COLONOSCOPY DUE TO HISTORY OF ADENOMATOUS COLONIC POLYPS: ICD-10-CM

## 2021-11-03 DIAGNOSIS — Z86.010 ENCOUNTER FOR COLONOSCOPY DUE TO HISTORY OF ADENOMATOUS COLONIC POLYPS: ICD-10-CM

## 2021-11-05 NOTE — TELEPHONE ENCOUNTER
MEMO patient via telephone for EGD/CS. Scheduled at [facility] for 01/04/2022 with an arrival time of 10:30am. Patient also advised that his/her arrival time may fluctuate based on [facility] guidelines. Prep packet mailed to verified address. MEMO April--Miralax .

## 2022-01-11 ENCOUNTER — LAB REQUISITION (OUTPATIENT)
Dept: LAB | Facility: HOSPITAL | Age: 57
End: 2022-01-11

## 2022-01-11 DIAGNOSIS — Z00.00 ENCOUNTER FOR GENERAL ADULT MEDICAL EXAMINATION WITHOUT ABNORMAL FINDINGS: ICD-10-CM

## 2022-01-11 LAB — SARS-COV-2 ORF1AB RESP QL NAA+PROBE: NOT DETECTED

## 2022-01-11 PROCEDURE — U0004 COV-19 TEST NON-CDC HGH THRU: HCPCS | Performed by: INTERNAL MEDICINE

## 2022-01-14 ENCOUNTER — OUTSIDE FACILITY SERVICE (OUTPATIENT)
Dept: GASTROENTEROLOGY | Facility: CLINIC | Age: 57
End: 2022-01-14

## 2022-01-14 PROCEDURE — 45385 COLONOSCOPY W/LESION REMOVAL: CPT | Performed by: INTERNAL MEDICINE

## 2022-01-14 PROCEDURE — 45390 COLONOSCOPY W/RESECTION: CPT | Performed by: INTERNAL MEDICINE

## 2022-01-14 PROCEDURE — 43239 EGD BIOPSY SINGLE/MULTIPLE: CPT | Performed by: INTERNAL MEDICINE

## 2022-01-14 RX ORDER — OMEPRAZOLE 20 MG/1
20 CAPSULE, DELAYED RELEASE ORAL DAILY
Qty: 90 CAPSULE | Refills: 3 | Status: SHIPPED | OUTPATIENT
Start: 2022-01-14

## 2022-01-24 NOTE — PROGRESS NOTES
Gastric biopsy showed mild nonspecific reactive changes.  Negative for H. Pylori.  Commend omeprazole 20 mg p.o. daily.    2 of The polyp(s) biopsies showed adenomatous change. This is not cancerous but is considered potentially precancerous. Follow-up colonoscopy in 3 years is advised.

## 2022-01-26 ENCOUNTER — TELEPHONE (OUTPATIENT)
Dept: GASTROENTEROLOGY | Facility: CLINIC | Age: 57
End: 2022-01-26

## 2022-01-26 NOTE — TELEPHONE ENCOUNTER
----- Message from Tasia Maldonado MD sent at 1/24/2022  5:30 PM EST -----  Gastric biopsy showed mild nonspecific reactive changes.  Negative for H. Pylori.  Commend omeprazole 20 mg p.o. daily.    2 of The polyp(s) biopsies showed adenomatous change. This is not cancerous but is considered potentially precancerous. Follow-up colonoscopy in 3 years is advised.

## 2022-01-26 NOTE — TELEPHONE ENCOUNTER
Called pt and advised of Dr Maldonado's note. Verb understanding.     C/s placed in recall and hm for 01/14/2025.

## 2022-12-21 ENCOUNTER — APPOINTMENT (OUTPATIENT)
Dept: WOMENS IMAGING | Facility: HOSPITAL | Age: 57
End: 2022-12-21
Payer: COMMERCIAL

## 2022-12-21 PROCEDURE — 77067 SCR MAMMO BI INCL CAD: CPT | Performed by: RADIOLOGY

## 2022-12-21 PROCEDURE — 77063 BREAST TOMOSYNTHESIS BI: CPT | Performed by: RADIOLOGY

## 2022-12-27 RX ORDER — OMEPRAZOLE 20 MG/1
CAPSULE, DELAYED RELEASE ORAL
Qty: 90 CAPSULE | Refills: 3 | OUTPATIENT
Start: 2022-12-27

## 2024-10-28 ENCOUNTER — PREP FOR SURGERY (OUTPATIENT)
Dept: OTHER | Facility: HOSPITAL | Age: 59
End: 2024-10-28
Payer: COMMERCIAL

## 2024-10-28 ENCOUNTER — TELEPHONE (OUTPATIENT)
Dept: GASTROENTEROLOGY | Facility: CLINIC | Age: 59
End: 2024-10-28
Payer: COMMERCIAL

## 2024-10-28 DIAGNOSIS — Z12.11 ENCOUNTER FOR SCREENING FOR MALIGNANT NEOPLASM OF COLON: Primary | ICD-10-CM

## 2024-10-28 DIAGNOSIS — Z86.0100 HISTORY OF COLON POLYPS: ICD-10-CM

## 2024-10-28 NOTE — TELEPHONE ENCOUNTER
LAST C/S   5/7/18  IN EPIC     PERSONAL HX OF POLYPS    NO FAMILY HX OF POLYPS    NO FAMILY HX OF COLON CA    NO ASA OR BLOOD THINNERS              LIST OF  MEDICATIONS  MOTRIN  AZELASTINE  BENAZEPRIL  BUPROPION  LOVASTATIN  OMEPRAZOLE  VENLAFAXINE  BIOTIN  MAGNESIUM  FLUTICASONE  OMEGA 3 FATTY ACIDS  VITAMIN B 12        OA QUESTIONNAIRE SCANNED IN MEDIA

## 2024-11-04 ENCOUNTER — TELEPHONE (OUTPATIENT)
Dept: GASTROENTEROLOGY | Facility: CLINIC | Age: 59
End: 2024-11-04
Payer: COMMERCIAL

## 2025-01-24 ENCOUNTER — OUTSIDE FACILITY SERVICE (OUTPATIENT)
Dept: GASTROENTEROLOGY | Facility: CLINIC | Age: 60
End: 2025-01-24
Payer: COMMERCIAL

## 2025-01-24 ENCOUNTER — LAB REQUISITION (OUTPATIENT)
Dept: LAB | Facility: HOSPITAL | Age: 60
End: 2025-01-24
Payer: COMMERCIAL

## 2025-01-24 DIAGNOSIS — K64.0 FIRST DEGREE HEMORRHOIDS: ICD-10-CM

## 2025-01-24 DIAGNOSIS — Z86.0100 PERSONAL HISTORY OF COLON POLYPS, UNSPECIFIED: ICD-10-CM

## 2025-01-24 DIAGNOSIS — D12.5 BENIGN NEOPLASM OF SIGMOID COLON: ICD-10-CM

## 2025-01-24 DIAGNOSIS — D12.3 BENIGN NEOPLASM OF TRANSVERSE COLON: ICD-10-CM

## 2025-01-24 DIAGNOSIS — K57.30 DIVERTICULOSIS OF LARGE INTESTINE WITHOUT PERFORATION OR ABSCESS WITHOUT BLEEDING: ICD-10-CM

## 2025-01-24 PROCEDURE — 88305 TISSUE EXAM BY PATHOLOGIST: CPT | Performed by: INTERNAL MEDICINE

## 2025-01-27 LAB
CYTO UR: NORMAL
LAB AP CASE REPORT: NORMAL
LAB AP CLINICAL INFORMATION: NORMAL
PATH REPORT.FINAL DX SPEC: NORMAL
PATH REPORT.GROSS SPEC: NORMAL

## 2025-02-07 ENCOUNTER — TELEPHONE (OUTPATIENT)
Dept: GASTROENTEROLOGY | Facility: CLINIC | Age: 60
End: 2025-02-07
Payer: COMMERCIAL

## 2025-02-07 NOTE — TELEPHONE ENCOUNTER
It is noted that this pt has seen their message from Dr Maldonado  Hm and cs recall placed for 1/24/30

## 2025-02-07 NOTE — TELEPHONE ENCOUNTER
----- Message from Tasia Maldonado sent at 2/6/2025  2:40 PM EST -----  The polyp(s) showed hyperplastic change, which is non-cancerous and not pre-cancerous. Follow-up colonoscopy in 5 years is advised.

## (undated) DEVICE — THE TORRENT IRRIGATION SCOPE CONNECTOR IS USED WITH THE TORRENT IRRIGATION TUBING TO PROVIDE IRRIGATION FLUIDS SUCH AS STERILE WATER DURING GASTROINTESTINAL ENDOSCOPIC PROCEDURES WHEN USED IN CONJUNCTION WITH AN IRRIGATION PUMP (OR ELECTROSURGICAL UNIT).: Brand: TORRENT

## (undated) DEVICE — TUBING, SUCTION, 1/4" X 10', STRAIGHT: Brand: MEDLINE

## (undated) DEVICE — THE SINGLE USE ETRAP – POLYP TRAP IS USED FOR SUCTION RETRIEVAL OF ENDOSCOPICALLY REMOVED POLYPS.: Brand: ETRAP

## (undated) DEVICE — SNAR POLYP SENSATION STDOVL 27 240 BX40

## (undated) DEVICE — CANN NASL CO2 TRULINK W/O2 A/

## (undated) DEVICE — PAD GRND REM POLYHESIVE A/ DISP

## (undated) DEVICE — SINGLE-USE BIOPSY FORCEPS: Brand: RADIAL JAW 4

## (undated) DEVICE — Device: Brand: DEFENDO AIR/WATER/SUCTION AND BIOPSY VALVE